# Patient Record
Sex: FEMALE | Race: WHITE | NOT HISPANIC OR LATINO | Employment: FULL TIME | URBAN - METROPOLITAN AREA
[De-identification: names, ages, dates, MRNs, and addresses within clinical notes are randomized per-mention and may not be internally consistent; named-entity substitution may affect disease eponyms.]

---

## 2017-02-13 PROBLEM — R53.83 FATIGUE: Status: ACTIVE | Noted: 2017-02-13

## 2020-06-02 ENCOUNTER — HOSPITAL ENCOUNTER (OUTPATIENT)
Facility: MEDICAL CENTER | Age: 45
End: 2020-06-02
Payer: COMMERCIAL

## 2020-06-04 LAB
SARS-COV-2 RNA SPEC QL NAA+PROBE: NOT DETECTED
SPECIMEN SOURCE: NORMAL

## 2020-07-29 ENCOUNTER — HOSPITAL ENCOUNTER (OUTPATIENT)
Facility: MEDICAL CENTER | Age: 45
End: 2020-07-29

## 2020-08-03 ENCOUNTER — HOSPITAL ENCOUNTER (EMERGENCY)
Facility: MEDICAL CENTER | Age: 45
End: 2020-08-03
Attending: EMERGENCY MEDICINE

## 2020-08-03 VITALS
TEMPERATURE: 97.5 F | BODY MASS INDEX: 28.67 KG/M2 | WEIGHT: 161.82 LBS | SYSTOLIC BLOOD PRESSURE: 118 MMHG | OXYGEN SATURATION: 97 % | HEART RATE: 84 BPM | HEIGHT: 63 IN | RESPIRATION RATE: 6 BRPM | DIASTOLIC BLOOD PRESSURE: 74 MMHG

## 2020-08-03 DIAGNOSIS — M54.12 CERVICAL RADICULOPATHY: ICD-10-CM

## 2020-08-03 DIAGNOSIS — M54.9 PAIN, UPPER BACK: ICD-10-CM

## 2020-08-03 PROCEDURE — 96372 THER/PROPH/DIAG INJ SC/IM: CPT

## 2020-08-03 PROCEDURE — A9270 NON-COVERED ITEM OR SERVICE: HCPCS | Performed by: EMERGENCY MEDICINE

## 2020-08-03 PROCEDURE — 99283 EMERGENCY DEPT VISIT LOW MDM: CPT

## 2020-08-03 PROCEDURE — 700111 HCHG RX REV CODE 636 W/ 250 OVERRIDE (IP): Performed by: EMERGENCY MEDICINE

## 2020-08-03 PROCEDURE — 700102 HCHG RX REV CODE 250 W/ 637 OVERRIDE(OP): Performed by: EMERGENCY MEDICINE

## 2020-08-03 RX ORDER — METHYLPREDNISOLONE 4 MG/1
TABLET ORAL
Qty: 1 EACH | Refills: 0 | Status: SHIPPED | OUTPATIENT
Start: 2020-08-03

## 2020-08-03 RX ORDER — CYCLOBENZAPRINE HCL 10 MG
10 TABLET ORAL 3 TIMES DAILY PRN
Qty: 12 TAB | Refills: 0 | Status: SHIPPED | OUTPATIENT
Start: 2020-08-03 | End: 2020-08-07

## 2020-08-03 RX ORDER — KETOROLAC TROMETHAMINE 30 MG/ML
15 INJECTION, SOLUTION INTRAMUSCULAR; INTRAVENOUS ONCE
Status: COMPLETED | OUTPATIENT
Start: 2020-08-03 | End: 2020-08-03

## 2020-08-03 RX ORDER — CYCLOBENZAPRINE HCL 10 MG
10 TABLET ORAL ONCE
Status: COMPLETED | OUTPATIENT
Start: 2020-08-03 | End: 2020-08-03

## 2020-08-03 RX ADMIN — KETOROLAC TROMETHAMINE 15 MG: 30 INJECTION, SOLUTION INTRAMUSCULAR at 15:29

## 2020-08-03 RX ADMIN — CYCLOBENZAPRINE HYDROCHLORIDE 10 MG: 10 TABLET, FILM COATED ORAL at 15:29

## 2020-08-03 NOTE — ED TRIAGE NOTES
"Chief Complaint   Patient presents with   • Back Pain     pain from neck to lower back with spasms with \"squeezing sensation\". started 1 week ago with pain and getting worse. denies trauma. pt states she has been going to chiropractor and geting massage. pt states body overuse from working as a [employer] .    • Bleeding/Bruising     on right arm. denies hitting/injuring arm        "

## 2020-08-03 NOTE — ED PROVIDER NOTES
"ED Provider Note    CHIEF COMPLAINT  Chief Complaint   Patient presents with   • Back Pain     pain from neck to lower back with spasms with \"squeezing sensation\". started 1 week ago with pain and getting worse. denies trauma. pt states she has been going to chiropractor and geting massage. pt states body overuse from working as a [employer] .    • Bleeding/Bruising     on right arm. denies hitting/injuring arm        HPI  Celeste Pratt is a 44 y.o. female who presents with right upper back pain just medial and inferior to her right shoulder blade and right neck pain.  Has been ongoing for about 2 weeks now and has been worse over the past week.  No history of trauma.  Has tried chiropractic and massage without much improvement.  Has been using ibuprofen without improvement as well.  She states that she works as a  at the [employer] and has been extremely busy recently.  She states that she typically carries a tray in her left arm.  She is right-hand dominant.  Notes some numbness and tingling in her right upper extremity occasionally as well.  No weakness.  No history of cervical spine injury or instrumentation.  No fever, cough, chest pain, trouble breathing.    REVIEW OF SYSTEMS  See HPI for further details. All other systems are negative.     PAST MEDICAL HISTORY   has a past medical history of Patient denies medical problems and Urinary tract infection.    SOCIAL HISTORY  Social History     Tobacco Use   • Smoking status: Current Some Day Smoker   • Smokeless tobacco: Never Used   Substance and Sexual Activity   • Alcohol use: Yes     Comment: occ   • Drug use: No   • Sexual activity: Yes       SURGICAL HISTORY   has a past surgical history that includes plastic surgery and hysterectomy, vaginal.    CURRENT MEDICATIONS  Home Medications    **Home medications have not yet been reviewed for this encounter**         ALLERGIES  No Known Allergies    PHYSICAL EXAM  VITAL SIGNS: /90   Pulse " "88   Temp 36.7 °C (98 °F) (Temporal)   Resp 16   Ht 1.6 m (5' 3\")   Wt 73.4 kg (161 lb 13.1 oz)   SpO2 99%   BMI 28.66 kg/m²   Pulse ox interpretation: I interpret this pulse ox as normal.  Constitutional: Alert in no apparent distress.  HENT: No signs of trauma, Bilateral external ears normal, Nose normal.   Eyes: Pupils are equal and reactive, Conjunctiva normal, Non-icteric.   Neck: Pain with range of motion while turning her head to the right, right lateral tenderness, Supple, No stridor.   Cardiovascular: Regular rate and rhythm.   Thorax & Lungs: Normal breath sounds, No respiratory distress, No wheezing, No chest tenderness.   Skin: Warm, Dry, No erythema, No rash.   Back: No bony tenderness, tenderness just medial and inferior to the left scapula without skin changes.  No CVA tenderness.   Extremities: Intact distal pulses, No edema, No tenderness, No cyanosis  Musculoskeletal: Good range of motion in all major joints. No tenderness to palpation or major deformities noted.   Neurologic: Alert, Normal motor function and gait, numbness to the right upper extremity.  Numbness to the right upper extremity.  Reproducible pain down the right arm when axially loading the head with head turned to the left    DIAGNOSTIC STUDIES / PROCEDURES      COURSE & MEDICAL DECISION MAKING    Medications   ketorolac (TORADOL) injection 15 mg (15 mg Intramuscular Given 8/3/20 1529)   cyclobenzaprine (FLEXERIL) tablet 10 mg (10 mg Oral Given 8/3/20 1529)       Pertinent Labs & Imaging studies reviewed. (See chart for details)  44 y.o. female presenting with right upper extremity numbness in conjunction of the right lateral neck pain and right shoulder pain.  Shoulder pain is on the back just inferior and medial to the scapula.  No weakness to the right upper extremity.  Normal range of motion of the right upper extremity.  Has limited range of motion when turning her head to the right.  Has a right lateral neck tenderness " "and reproducible pain down her arm with axial loading.  Consistent with radiculopathy.  Likely has muscle spasms to the back from strain injury or repetitive use injury.    Patient was treated here with Toradol and Flexeril with improvement.  Improved range of motion of the neck.  Likely musculoskeletal pain as the root cause of the patient's discomfort.  We will continue outpatient oral analgesic medications along with steroids to see if this should help with suspected radiculopathy.  She would benefit from further outpatient management with primary care and possibly with physical therapy and/or spine.  Patient was given the next few days off from work to help recover.  No obvious signs of meningismus.  Low suspicion for dissection.    The patient will not drink alcohol nor drive with prescribed medications.   The patient was instructed to follow-up with primary care physician for further management.  To return immediately for any worsening symptoms or development of any other concerning signs or symptoms. The patient verbalizes understanding in their own words.    /90   Pulse 88   Temp 36.7 °C (98 °F) (Temporal)   Resp 16   Ht 1.6 m (5' 3\")   Wt 73.4 kg (161 lb 13.1 oz)   SpO2 99%   BMI 28.66 kg/m²     The patient was referred to primary care where they will receive further BP management.      Navneet Reed M.D.  [address] [place] [address]  [facility] [address]  [facility] [address] [phone]    Schedule an appointment as soon as possible for a visit       [facility], Emergency Dept  [address] [facility] [address] [phone]    As needed, If symptoms worsen      FINAL IMPRESSION  1. Cervical radiculopathy    2. Pain, upper back            Electronically signed by: Mc Carrasco M.D., 8/3/2020 2:54 PM    "

## 2020-08-03 NOTE — Clinical Note
Celeste Pratt was seen and treated in our emergency department on 8/3/2020.  She may return to work on 08/06/2020.       If you have any questions or concerns, please don't hesitate to call.      Mc Carrasco M.D.

## 2020-08-03 NOTE — ED NOTES
Her own fingers line up with kincaid.  Pt laughed, verbalized she probably bruised herself when trying to stretch her shoulder

## 2020-10-19 ENCOUNTER — HOSPITAL ENCOUNTER (OUTPATIENT)
Dept: LAB | Facility: MEDICAL CENTER | Age: 45
End: 2020-10-19
Payer: COMMERCIAL

## 2020-10-19 LAB
COVID ORDER STATUS COVID19: NORMAL
SARS-COV-2 RNA RESP QL NAA+PROBE: NOTDETECTED
SPECIMEN SOURCE: NORMAL

## 2020-11-12 ENCOUNTER — HOSPITAL ENCOUNTER (OUTPATIENT)
Dept: LAB | Facility: MEDICAL CENTER | Age: 45
End: 2020-11-12
Payer: COMMERCIAL

## 2020-11-13 LAB — COVID ORDER STATUS COVID19: NORMAL

## 2020-11-14 LAB
SARS-COV-2 RNA RESP QL NAA+PROBE: NOTDETECTED
SPECIMEN SOURCE: NORMAL

## 2021-01-19 NOTE — ED TRIAGE NOTES
"Chief Complaint   Patient presents with   • Back Pain     pain from neck to lower back with spasms with \"squeezing sensation\". started 1 week ago with pain and getting worse. denies trauma. pt states she has been going to chiropractor and geting massage. pt states body overuse from working as a [employer] .        " Fever/Cough/Malaise/Headache/Muscle Pain not due a chronic condition